# Patient Record
Sex: MALE | Race: WHITE | NOT HISPANIC OR LATINO | Employment: FULL TIME | URBAN - NONMETROPOLITAN AREA
[De-identification: names, ages, dates, MRNs, and addresses within clinical notes are randomized per-mention and may not be internally consistent; named-entity substitution may affect disease eponyms.]

---

## 2023-01-23 ENCOUNTER — HOSPITAL ENCOUNTER (EMERGENCY)
Facility: HOSPITAL | Age: 23
Discharge: HOME | End: 2023-01-23
Admitting: EMERGENCY MEDICINE

## 2023-01-23 VITALS
SYSTOLIC BLOOD PRESSURE: 132 MMHG | HEIGHT: 65 IN | DIASTOLIC BLOOD PRESSURE: 72 MMHG | HEART RATE: 67 BPM | BODY MASS INDEX: 21.99 KG/M2 | OXYGEN SATURATION: 99 % | TEMPERATURE: 98.2 F | WEIGHT: 132 LBS | RESPIRATION RATE: 16 BRPM
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
## 2023-01-23 DIAGNOSIS — S50.869A INSECT BITE OF FOREARM, UNSPECIFIED LATERALITY, INITIAL ENCOUNTER: Primary | ICD-10-CM

## 2023-01-23 PROCEDURE — 99282 EMERGENCY DEPT VISIT SF MDM: CPT

## 2023-01-23 RX ORDER — DIPHENHYDRAMINE HCL 25 MG
25 TABLET ORAL EVERY 8 HOURS PRN
Qty: 30 TABLET | Refills: 0 | Status: SHIPPED | OUTPATIENT
Start: 2023-01-23

## 2023-01-23 RX ORDER — TRIAMCINOLONE ACETONIDE 1 MG/G
1 CREAM TOPICAL 3 TIMES DAILY
Qty: 45 G | Refills: 0 | Status: SHIPPED | OUTPATIENT
Start: 2023-01-23

## 2023-01-24 NOTE — DISCHARGE INSTRUCTIONS
I believe the lesions on your arms appear to be consistent with bug bites, I am sending you home with a prescription for Kenalog cream to apply to the areas as well as oral Benadryl to help with the itching.  Please keep the areas clean and dry.  You should consider washing all bedding and fabrics in your home in hot water.  Please return to the ED with any new, worsening, or persistent symptoms.  Follow-up with the primary care doctor within the week for wound check.

## 2023-01-24 NOTE — ED PROVIDER NOTES
Subjective   History of Present Illness  Pt is a 22 year old male who presents to the ED today c/o rash to bilateral arms and back for the last 2 weeks. He denies any new medication, no known food allergies, but does state he started using a new beard oil about a month ago. He also started taking a new vitamin around that time as well for beard growth. On my exam the lesions are noted to bilateral arms with 2 lesions to the back. He reports the lesions are very itchy. They appear to be consistent with insect bites with bed bugs in the differential. Pt states none of the other family members in the house have bites but he did just purchase new furniture. The lesions do not appear to be infected, there is no drainage noted, pt denies any systemic symptoms. VS are reassuring.     History provided by:  Patient   used: No        Review of Systems   Constitutional: Negative for chills, diaphoresis, fatigue and fever.   HENT: Negative.    Eyes: Negative.    Respiratory: Negative.    Cardiovascular: Negative.    Gastrointestinal: Negative.    Genitourinary: Negative.    Musculoskeletal: Negative for back pain, neck pain and neck stiffness.   Skin: Positive for rash.   Neurological: Negative.    Psychiatric/Behavioral: Negative.        No past medical history on file.    No Known Allergies    No past surgical history on file.    No family history on file.    Social History     Socioeconomic History   • Marital status: Single           Objective   Physical Exam  Vitals and nursing note reviewed.   Constitutional:       General: He is not in acute distress.     Appearance: Normal appearance. He is not ill-appearing, toxic-appearing or diaphoretic.   HENT:      Head: Normocephalic and atraumatic.      Right Ear: External ear normal.      Left Ear: External ear normal.      Nose: Nose normal.   Eyes:      Extraocular Movements: Extraocular movements intact.      Conjunctiva/sclera: Conjunctivae normal.       Pupils: Pupils are equal, round, and reactive to light.   Cardiovascular:      Rate and Rhythm: Normal rate and regular rhythm.      Pulses: Normal pulses.      Heart sounds: Normal heart sounds.   Pulmonary:      Effort: Pulmonary effort is normal.      Breath sounds: Normal breath sounds.   Musculoskeletal:      Cervical back: Normal range of motion and neck supple. No rigidity or tenderness.   Skin:     General: Skin is warm and dry.      Capillary Refill: Capillary refill takes less than 2 seconds.      Findings: Lesion present.      Comments: Multiple lesions noted to bilateral arms with 2 lesions noted to the back, these appear to be consistent with insect bites, there is no drainage, pt reports itching, no signs of infection at this time.   Neurological:      Mental Status: He is alert and oriented to person, place, and time. Mental status is at baseline.   Psychiatric:         Mood and Affect: Mood normal.         Behavior: Behavior normal.         Thought Content: Thought content normal.         Judgment: Judgment normal.         Procedures           ED Course  ED Course as of 01/24/23 1514   Mon Jan 23, 2023   1859 I discussed with the patient that these are likely bug bites.  I am sending him home with a prescription for a topical cream as well as oral Benadryl for itching relief.  I discussed this patient's case with Dr. Duvall who agrees patient is safe for discharge.  Vital signs reassuring at this time.  Patient voices understanding to instructions and agrees with this plan of care. [HE]      ED Course User Index  [HE] Amanda Tony APRN                                           Medical Decision Making  Pt is a 22 year old male who presents to the ED today c/o rash to bilateral arms and back for the last 2 weeks. He denies any new medication, no known food allergies, but does state he started using a new beard oil about a month ago. He also started taking a new vitamin around that time as well for  beard growth. On my exam the lesions are noted to bilateral arms with 2 lesions to the back. He reports the lesions are very itchy. They appear to be consistent with insect bites with bed bugs in the differential. Pt states none of the other family members in the house have bites but he did just purchase new furniture. The lesions do not appear to be infected, there is no drainage noted, pt denies any systemic symptoms. VS are reassuring.     Dr. Duvall has also viewed these lesions and agrees they are likely insect bites with bed bugs in the differential. I will send the pt home with a prescriptions for topical kenalog cream and oral benadryl for itching. We discussed that he should wash all fabrics in hot water and consider calling an  if he is also concerned for bed bugs. He is to follow up with a PCP within the week and return to the ED with any new, worsening, or persistent symptoms. He voiced understanding and agrees with plan of care. I have discussed the pt's case with Dr. Duvall who agrees pt is safe for discharge. VS reassuring, pt discharged in stable condition.     Insect bite of forearm, unspecified laterality, initial encounter: acute illness or injury  Risk  OTC drugs.  Prescription drug management.          Final diagnoses:   Insect bite of forearm, unspecified laterality, initial encounter       ED Disposition  ED Disposition     ED Disposition   Discharge    Condition   Stable    Comment   --             PATIENT CONNECTION - [facility] [id number]  [phone]  In 2 days           Medication List      New Prescriptions    diphenhydrAMINE 25 MG tablet  Commonly known as: Benadryl Allergy  Take 1 tablet by mouth Every 8 (Eight) Hours As Needed for Itching.     triamcinolone 0.1 % cream  Commonly known as: KENALOG  Apply 1 application topically to the appropriate area as directed 3 (Three) Times a Day.           Where to Get Your Medications      These medications were sent to  [facility]/pharmacy #[id number] - [place], [place] - [address] AT [address] FROM [address] - [phone]  - [phone]   [address] [place] [address]    Hours: 24-hours Phone: [phone]   · diphenhydrAMINE 25 MG tablet  · triamcinolone 0.1 % cream          Amanda Tony, APRN  01/24/23 4749

## 2023-01-24 NOTE — ED NOTES
Pt reports starting a new lotion along with vitamin pills for his beard approx 1 month ago.  Pt reports rash started approx 2 weeks ago. Pt denies being seeing or taking any medication as treatment.

## 2023-01-30 ENCOUNTER — HOSPITAL ENCOUNTER (EMERGENCY)
Facility: HOSPITAL | Age: 23
Discharge: HOME | End: 2023-01-30
Admitting: EMERGENCY MEDICINE

## 2023-01-30 VITALS
HEIGHT: 65 IN | BODY MASS INDEX: 22.33 KG/M2 | WEIGHT: 134 LBS | TEMPERATURE: 98.5 F | SYSTOLIC BLOOD PRESSURE: 124 MMHG | HEART RATE: 70 BPM | OXYGEN SATURATION: 100 % | RESPIRATION RATE: 16 BRPM | DIASTOLIC BLOOD PRESSURE: 74 MMHG
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
## 2023-01-30 PROCEDURE — 99282 EMERGENCY DEPT VISIT SF MDM: CPT

## 2023-01-30 PROCEDURE — 96372 THER/PROPH/DIAG INJ SC/IM: CPT

## 2023-01-30 PROCEDURE — 25010000002 DEXAMETHASONE PER 1 MG: Performed by: NURSE PRACTITIONER

## 2023-01-30 PROCEDURE — 99283 EMERGENCY DEPT VISIT LOW MDM: CPT

## 2023-01-30 RX ORDER — METHYLPREDNISOLONE 4 MG/1
TABLET ORAL
Qty: 21 EACH | Refills: 0 | Status: SHIPPED | OUTPATIENT
Start: 2023-01-30

## 2023-01-30 RX ORDER — DEXAMETHASONE SODIUM PHOSPHATE 10 MG/ML
8 INJECTION INTRAMUSCULAR; INTRAVENOUS ONCE
Status: COMPLETED | OUTPATIENT
Start: 2023-01-30 | End: 2023-01-30

## 2023-01-30 RX ADMIN — DEXAMETHASONE SODIUM PHOSPHATE 8 MG: 10 INJECTION INTRAMUSCULAR; INTRAVENOUS at 19:07

## 2023-02-01 NOTE — ED PROVIDER NOTES
Subjective   History of Present Illness  Patient is a 22-year-old male who presents to the ER with complaints of bug bites.  Patient was evaluated in this emergency department on January 23, 2023 and diagnosed with likely insect bites/bedbug bites.  Patient states he had his home exterminated however the lesions have worsened.  He was prescribed steroid cream without any relief.  He has not been on any new medications or eaten any new foods.  He does have pets in the home.  He describes the lesions as pruritic in nature.        Review of Systems   Constitutional: Negative.  Negative for fever.   HENT: Negative.  Negative for congestion.    Eyes: Negative.    Respiratory: Negative.  Negative for cough and shortness of breath.    Cardiovascular: Negative.  Negative for chest pain.   Gastrointestinal: Negative.  Negative for abdominal pain, diarrhea, nausea and vomiting.   Genitourinary: Negative.    Musculoskeletal: Negative.  Negative for back pain.   Skin:        Positive for skin lesions   All other systems reviewed and are negative.      Past Medical History:   Diagnosis Date   • Patient denies medical problems        No Known Allergies    Past Surgical History:   Procedure Laterality Date   • NO PAST SURGERIES         History reviewed. No pertinent family history.    Social History     Socioeconomic History   • Marital status: Single   Tobacco Use   • Smoking status: Former     Types: Cigarettes   Vaping Use   • Vaping Use: Every day   Substance and Sexual Activity   • Alcohol use: Yes     Comment: occ   • Sexual activity: Not Currently           Objective   Physical Exam  Vitals and nursing note reviewed.   Constitutional:       General: He is not in acute distress.     Appearance: He is well-developed. He is not diaphoretic.   HENT:      Head: Atraumatic.      Right Ear: External ear normal.      Left Ear: External ear normal.      Nose: Nose normal.      Mouth/Throat:      Pharynx: Oropharynx is clear.   Eyes:       General: No scleral icterus.     Conjunctiva/sclera: Conjunctivae normal.      Pupils: Pupils are equal, round, and reactive to light.   Neck:      Thyroid: No thyromegaly.      Vascular: No JVD.   Cardiovascular:      Rate and Rhythm: Normal rate and regular rhythm.      Heart sounds: Normal heart sounds. No murmur heard.  Pulmonary:      Effort: Pulmonary effort is normal. No respiratory distress.      Breath sounds: Normal breath sounds. No wheezing or rales.   Chest:      Chest wall: No tenderness.   Abdominal:      General: Bowel sounds are normal. There is no distension.      Palpations: Abdomen is soft. There is no mass.      Tenderness: There is no abdominal tenderness. There is no guarding or rebound.   Musculoskeletal:         General: Normal range of motion.      Cervical back: Normal range of motion and neck supple.   Lymphadenopathy:      Cervical: No cervical adenopathy.   Skin:     General: Skin is warm and dry.      Capillary Refill: Capillary refill takes less than 2 seconds.      Coloration: Skin is not pale.      Findings: No erythema or rash.      Comments: Patient has scattered lesions to upper and lower extremities indicative of insect bites or bedbug bites.  No drainage to these lesions identified, no indication of abscess, no surrounding erythema   Neurological:      General: No focal deficit present.      Mental Status: He is alert and oriented to person, place, and time.      Cranial Nerves: No cranial nerve deficit.      Coordination: Coordination normal.      Deep Tendon Reflexes: Reflexes are normal and symmetric.   Psychiatric:         Mood and Affect: Mood normal.         Behavior: Behavior normal.         Thought Content: Thought content normal.         Judgment: Judgment normal.         Procedures           ED Course                                           Medical Decision Making  Patient is a 22-year-old male who presents to the ER with complaints of bug bites.  Patient was  evaluated in this emergency department on January 23, 2023 and diagnosed with likely insect bites/bedbug bites.  Patient states he had his home exterminated however the lesions have worsened.  He was prescribed steroid cream without any relief.  He has not been on any new medications or eaten any new foods.  He does have pets in the home.  He describes the lesions as pruritic in nature.  Differential diagnosis: Insect bites, bedbug bites, allergic reaction, and other  Patient received a steroid injection and we will prescribe steroids.  He will need to follow-up with PCP with no improvements.    Insect bite, unspecified site, initial encounter: complicated acute illness or injury  Risk  Prescription drug management.          Final diagnoses:   Insect bite, unspecified site, initial encounter       ED Disposition  ED Disposition     ED Disposition   Discharge    Condition   Good    Comment   --             No follow-up provider specified.       Medication List      New Prescriptions    methylPREDNISolone 4 MG dose pack  Commonly known as: MEDROL  Take as directed on package instructions.           Where to Get Your Medications      These medications were sent to [facility]/pharmacy #[id number] - [place], [place] - [address] AT [address] FROM [address]  [phone] [facility] [phone]   [address] [place] [address]    Hours: 24-hours Phone: [phone]   · methylPREDNISolone 4 MG dose pack          India Redding, APRN  01/31/23 2057

## 2024-10-01 ENCOUNTER — HOSPITAL ENCOUNTER (EMERGENCY)
Age: 24
Discharge: ANOTHER HOSPITAL | End: 2024-10-02
Attending: PEDIATRICS
Payer: COMMERCIAL
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
## 2024-10-01 DIAGNOSIS — S27.322A CONTUSION OF BOTH LUNGS, INITIAL ENCOUNTER: ICD-10-CM

## 2024-10-01 DIAGNOSIS — S27.0XXA TRAUMATIC PNEUMOTHORAX, INITIAL ENCOUNTER: ICD-10-CM

## 2024-10-01 DIAGNOSIS — S32.040A CLOSED COMPRESSION FRACTURE OF L4 LUMBAR VERTEBRA, INITIAL ENCOUNTER (HCC): ICD-10-CM

## 2024-10-01 PROCEDURE — 32551 INSERTION OF CHEST TUBE: CPT

## 2024-10-01 PROCEDURE — 96374 THER/PROPH/DIAG INJ IV PUSH: CPT

## 2024-10-01 PROCEDURE — 99285 EMERGENCY DEPT VISIT HI MDM: CPT

## 2024-10-01 RX ORDER — FENTANYL CITRATE 50 UG/ML
100 INJECTION, SOLUTION INTRAMUSCULAR; INTRAVENOUS ONCE
Status: COMPLETED | OUTPATIENT
Start: 2024-10-01 | End: 2024-10-02

## 2024-10-01 ASSESSMENT — PAIN - FUNCTIONAL ASSESSMENT: PAIN_FUNCTIONAL_ASSESSMENT: 0-10

## 2024-10-01 ASSESSMENT — PAIN SCALES - GENERAL: PAINLEVEL_OUTOF10: 7

## 2024-10-02 ENCOUNTER — APPOINTMENT (OUTPATIENT)
Dept: GENERAL RADIOLOGY | Age: 24
End: 2024-10-02
Payer: COMMERCIAL

## 2024-10-02 ENCOUNTER — APPOINTMENT (OUTPATIENT)
Dept: CT IMAGING | Age: 24
End: 2024-10-02
Payer: COMMERCIAL

## 2024-10-02 VITALS
RESPIRATION RATE: 23 BRPM | OXYGEN SATURATION: 98 % | HEART RATE: 80 BPM | WEIGHT: 150 LBS | TEMPERATURE: 97.9 F | SYSTOLIC BLOOD PRESSURE: 156 MMHG | DIASTOLIC BLOOD PRESSURE: 74 MMHG

## 2024-10-02 LAB
ALBUMIN SERPL-MCNC: 4.5 G/DL (ref 3.5–5.2)
ALP SERPL-CCNC: 83 U/L (ref 40–129)
ALT SERPL-CCNC: 131 U/L (ref 5–41)
ANION GAP SERPL CALCULATED.3IONS-SCNC: 13 MMOL/L (ref 7–19)
AST SERPL-CCNC: 162 U/L (ref 5–40)
BASOPHILS # BLD: 0 K/UL (ref 0–0.2)
BASOPHILS NFR BLD: 0.3 % (ref 0–1)
BILIRUB SERPL-MCNC: 0.2 MG/DL (ref 0.2–1.2)
BUN SERPL-MCNC: 14 MG/DL (ref 6–20)
CALCIUM SERPL-MCNC: 9 MG/DL (ref 8.6–10)
CHLORIDE SERPL-SCNC: 101 MMOL/L (ref 98–111)
CO2 SERPL-SCNC: 25 MMOL/L (ref 22–29)
CREAT SERPL-MCNC: 0.7 MG/DL (ref 0.7–1.2)
EOSINOPHIL # BLD: 0.2 K/UL (ref 0–0.6)
EOSINOPHIL NFR BLD: 1.2 % (ref 0–5)
ERYTHROCYTE [DISTWIDTH] IN BLOOD BY AUTOMATED COUNT: 11.3 % (ref 11.5–14.5)
ETHANOLAMINE SERPL-MCNC: <10 MG/DL (ref 0–0.08)
GLUCOSE BLD-MCNC: 154 MG/DL
GLUCOSE BLD-MCNC: 154 MG/DL (ref 70–99)
GLUCOSE SERPL-MCNC: 146 MG/DL (ref 70–99)
HCT VFR BLD AUTO: 41.7 % (ref 42–52)
HGB BLD-MCNC: 14.5 G/DL (ref 14–18)
IMM GRANULOCYTES # BLD: 0.2 K/UL
INR PPP: 1.13 (ref 0.88–1.18)
LYMPHOCYTES # BLD: 2.1 K/UL (ref 1.1–4.5)
LYMPHOCYTES NFR BLD: 14.4 % (ref 20–40)
MCH RBC QN AUTO: 30.5 PG (ref 27–31)
MCHC RBC AUTO-ENTMCNC: 34.8 G/DL (ref 33–37)
MCV RBC AUTO: 87.6 FL (ref 80–94)
MONOCYTES # BLD: 0.6 K/UL (ref 0–0.9)
MONOCYTES NFR BLD: 4.5 % (ref 0–10)
NEUTROPHILS # BLD: 11.1 K/UL (ref 1.5–7.5)
NEUTS SEG NFR BLD: 78.1 % (ref 50–65)
PERFORMED ON: ABNORMAL
PLATELET # BLD AUTO: 207 K/UL (ref 130–400)
PMV BLD AUTO: 10.3 FL (ref 9.4–12.4)
POTASSIUM SERPL-SCNC: 4.4 MMOL/L (ref 3.5–5)
PROT SERPL-MCNC: 7.5 G/DL (ref 6.4–8.3)
PROTHROMBIN TIME: 14.2 SEC (ref 12–14.6)
RBC # BLD AUTO: 4.76 M/UL (ref 4.7–6.1)
SODIUM SERPL-SCNC: 139 MMOL/L (ref 136–145)
TROPONIN, HIGH SENSITIVITY: <6 NG/L (ref 0–22)
WBC # BLD AUTO: 14.3 K/UL (ref 4.8–10.8)

## 2024-10-02 PROCEDURE — 71260 CT THORAX DX C+: CPT

## 2024-10-02 PROCEDURE — 84484 ASSAY OF TROPONIN QUANT: CPT

## 2024-10-02 PROCEDURE — 72170 X-RAY EXAM OF PELVIS: CPT

## 2024-10-02 PROCEDURE — 85610 PROTHROMBIN TIME: CPT

## 2024-10-02 PROCEDURE — 90471 IMMUNIZATION ADMIN: CPT | Performed by: PEDIATRICS

## 2024-10-02 PROCEDURE — 6360000002 HC RX W HCPCS

## 2024-10-02 PROCEDURE — 2500000003 HC RX 250 WO HCPCS: Performed by: PEDIATRICS

## 2024-10-02 PROCEDURE — 72128 CT CHEST SPINE W/O DYE: CPT

## 2024-10-02 PROCEDURE — 85025 COMPLETE CBC W/AUTO DIFF WBC: CPT

## 2024-10-02 PROCEDURE — 96375 TX/PRO/DX INJ NEW DRUG ADDON: CPT

## 2024-10-02 PROCEDURE — 74177 CT ABD & PELVIS W/CONTRAST: CPT

## 2024-10-02 PROCEDURE — 72131 CT LUMBAR SPINE W/O DYE: CPT

## 2024-10-02 PROCEDURE — 36415 COLL VENOUS BLD VENIPUNCTURE: CPT

## 2024-10-02 PROCEDURE — 82077 ASSAY SPEC XCP UR&BREATH IA: CPT

## 2024-10-02 PROCEDURE — 71045 X-RAY EXAM CHEST 1 VIEW: CPT

## 2024-10-02 PROCEDURE — 6360000002 HC RX W HCPCS: Performed by: PEDIATRICS

## 2024-10-02 PROCEDURE — 2580000003 HC RX 258: Performed by: PEDIATRICS

## 2024-10-02 PROCEDURE — 96376 TX/PRO/DX INJ SAME DRUG ADON: CPT

## 2024-10-02 PROCEDURE — 72125 CT NECK SPINE W/O DYE: CPT

## 2024-10-02 PROCEDURE — 82962 GLUCOSE BLOOD TEST: CPT

## 2024-10-02 PROCEDURE — 80053 COMPREHEN METABOLIC PANEL: CPT

## 2024-10-02 PROCEDURE — 70450 CT HEAD/BRAIN W/O DYE: CPT

## 2024-10-02 PROCEDURE — 6360000004 HC RX CONTRAST MEDICATION: Performed by: PEDIATRICS

## 2024-10-02 PROCEDURE — 90715 TDAP VACCINE 7 YRS/> IM: CPT | Performed by: PEDIATRICS

## 2024-10-02 RX ORDER — KETAMINE HYDROCHLORIDE 100 MG/ML
INJECTION, SOLUTION INTRAMUSCULAR; INTRAVENOUS
Status: COMPLETED
Start: 2024-10-02 | End: 2024-10-02

## 2024-10-02 RX ORDER — ONDANSETRON 2 MG/ML
INJECTION INTRAMUSCULAR; INTRAVENOUS
Status: COMPLETED
Start: 2024-10-02 | End: 2024-10-02

## 2024-10-02 RX ORDER — HYDROMORPHONE HYDROCHLORIDE 1 MG/ML
0.5 INJECTION, SOLUTION INTRAMUSCULAR; INTRAVENOUS; SUBCUTANEOUS EVERY 30 MIN PRN
Status: COMPLETED | OUTPATIENT
Start: 2024-10-02 | End: 2024-10-02

## 2024-10-02 RX ORDER — 0.9 % SODIUM CHLORIDE 0.9 %
1000 INTRAVENOUS SOLUTION INTRAVENOUS ONCE
Status: COMPLETED | OUTPATIENT
Start: 2024-10-02 | End: 2024-10-02

## 2024-10-02 RX ORDER — ONDANSETRON 2 MG/ML
4 INJECTION INTRAMUSCULAR; INTRAVENOUS ONCE
Status: COMPLETED | OUTPATIENT
Start: 2024-10-02 | End: 2024-10-02

## 2024-10-02 RX ORDER — IOPAMIDOL 755 MG/ML
70 INJECTION, SOLUTION INTRAVASCULAR
Status: COMPLETED | OUTPATIENT
Start: 2024-10-02 | End: 2024-10-02

## 2024-10-02 RX ADMIN — Medication 34 MG: at 02:18

## 2024-10-02 RX ADMIN — HYDROMORPHONE HYDROCHLORIDE 0.5 MG: 1 INJECTION, SOLUTION INTRAMUSCULAR; INTRAVENOUS; SUBCUTANEOUS at 02:00

## 2024-10-02 RX ADMIN — ONDANSETRON 4 MG: 2 INJECTION INTRAMUSCULAR; INTRAVENOUS at 01:31

## 2024-10-02 RX ADMIN — IOPAMIDOL 70 ML: 755 INJECTION, SOLUTION INTRAVENOUS at 00:20

## 2024-10-02 RX ADMIN — TETANUS TOXOID, REDUCED DIPHTHERIA TOXOID AND ACELLULAR PERTUSSIS VACCINE, ADSORBED 0.5 ML: 5; 2.5; 8; 8; 2.5 SUSPENSION INTRAMUSCULAR at 00:05

## 2024-10-02 RX ADMIN — HYDROMORPHONE HYDROCHLORIDE 0.5 MG: 1 INJECTION, SOLUTION INTRAMUSCULAR; INTRAVENOUS; SUBCUTANEOUS at 01:30

## 2024-10-02 RX ADMIN — SODIUM CHLORIDE 1000 ML: 9 INJECTION, SOLUTION INTRAVENOUS at 00:08

## 2024-10-02 RX ADMIN — HYDROMORPHONE HYDROCHLORIDE 0.5 MG: 1 INJECTION, SOLUTION INTRAMUSCULAR; INTRAVENOUS; SUBCUTANEOUS at 02:46

## 2024-10-02 RX ADMIN — FENTANYL CITRATE 100 MCG: 50 INJECTION INTRAMUSCULAR; INTRAVENOUS at 00:05

## 2024-10-02 ASSESSMENT — PAIN SCALES - GENERAL: PAINLEVEL_OUTOF10: 5

## 2024-10-02 NOTE — ED NOTES
Lv: 11/11/2021 & Annual    No upcoming appt with pcp   Pt given urinal and encouraged to give sample when he can.

## 2024-10-02 NOTE — ED NOTES
Patient accepted at [facility]. Dr. Piyush Delarosa accepting. Fax chart to [phone]. Call report to [phone] opt.4
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
## 2024-10-02 NOTE — ED NOTES
Portable CXR at bedside, tube needing to advanced more. Chest tube advanced to 16 cm by Dr. Parrish and resutured in place. Pt tolerating well.
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
## 2024-10-03 ASSESSMENT — ENCOUNTER SYMPTOMS
EYE PAIN: 0
VOMITING: 0
SHORTNESS OF BREATH: 0
COUGH: 0
ABDOMINAL PAIN: 0
COLOR CHANGE: 0
RHINORRHEA: 0
BACK PAIN: 1
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
## 2024-10-04 LAB
EKG P AXIS: 64 DEGREES
EKG P-R INTERVAL: 184 MS
EKG Q-T INTERVAL: 380 MS
EKG QRS DURATION: 112 MS
EKG QTC CALCULATION (BAZETT): 395 MS
EKG T AXIS: 60 DEGREES

## 2024-11-21 ENCOUNTER — OFFICE VISIT (OUTPATIENT)
Dept: NEUROSURGERY | Facility: CLINIC | Age: 24
End: 2024-11-21
Payer: COMMERCIAL

## 2024-11-21 ENCOUNTER — HOSPITAL ENCOUNTER (OUTPATIENT)
Dept: GENERAL RADIOLOGY | Facility: HOSPITAL | Age: 24
Discharge: HOME | End: 2024-11-21
Admitting: PHYSICIAN ASSISTANT
Payer: COMMERCIAL

## 2024-11-21 VITALS — BODY MASS INDEX: 23.09 KG/M2 | WEIGHT: 138.6 LBS | HEIGHT: 65 IN

## 2024-11-21 DIAGNOSIS — Z72.0 VAPES NICOTINE CONTAINING SUBSTANCE: ICD-10-CM

## 2024-11-21 DIAGNOSIS — S32.040A COMPRESSION FRACTURE OF L4 VERTEBRA, INITIAL ENCOUNTER: ICD-10-CM

## 2024-11-21 DIAGNOSIS — S32.040A COMPRESSION FRACTURE OF L4 VERTEBRA, INITIAL ENCOUNTER: Primary | ICD-10-CM

## 2024-11-21 PROCEDURE — 72110 X-RAY EXAM L-2 SPINE 4/>VWS: CPT

## 2024-11-21 RX ORDER — HYDROCODONE BITARTRATE AND ACETAMINOPHEN 5; 325 MG/1; MG/1
1 TABLET ORAL EVERY 6 HOURS PRN
Qty: 20 TABLET | Refills: 0 | Status: SHIPPED | OUTPATIENT
Start: 2024-11-21 | End: 2024-11-26

## 2024-11-21 RX ORDER — DICLOFENAC SODIUM 75 MG/1
75 TABLET, DELAYED RELEASE ORAL 2 TIMES DAILY
Qty: 60 TABLET | Refills: 0 | Status: SHIPPED | OUTPATIENT
Start: 2024-11-21

## 2024-11-21 NOTE — LETTER
November 21, 2024     Patient: Syd Jacobson   YOB: 2000   Date of Visit: 11/21/2024       To Whom It May Concern:    It is my medical opinion that Syd Jacobson will continue off work. Patient has the following restrictions no bending or lifting over 10 pounds           Sincerely,        Azael Zaman PA-C    CC: No Recipients
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
## 2024-11-21 NOTE — PATIENT INSTRUCTIONS
Resume brace.  No lifting > 10 pounds. Avoid bending and twisting  Continue off work  Stop ibuprofen  Diclofenac has been sent to the pharmacy.  I will asked Dr. Paula to send you some pain medication to utilize sparingly  MRIs been ordered of the lumbar spine.  Follow-up here after the MRIs completed for review  Call the office for sooner appointment if you have any worsening pain, weakness or other issues or concerns.

## 2024-12-12 ENCOUNTER — HOSPITAL ENCOUNTER (OUTPATIENT)
Dept: MRI IMAGING | Facility: HOSPITAL | Age: 24
Discharge: HOME | End: 2024-12-12
Admitting: PHYSICIAN ASSISTANT
Payer: COMMERCIAL

## 2024-12-12 DIAGNOSIS — S32.040A COMPRESSION FRACTURE OF L4 VERTEBRA, INITIAL ENCOUNTER: ICD-10-CM

## 2024-12-12 PROCEDURE — 72148 MRI LUMBAR SPINE W/O DYE: CPT

## 2024-12-17 ENCOUNTER — TELEPHONE (OUTPATIENT)
Dept: NEUROSURGERY | Facility: CLINIC | Age: 24
End: 2024-12-17
Payer: COMMERCIAL